# Patient Record
Sex: MALE | Race: BLACK OR AFRICAN AMERICAN | URBAN - METROPOLITAN AREA
[De-identification: names, ages, dates, MRNs, and addresses within clinical notes are randomized per-mention and may not be internally consistent; named-entity substitution may affect disease eponyms.]

---

## 2018-02-14 ENCOUNTER — HISTORICAL (OUTPATIENT)
Dept: ADMINISTRATIVE | Facility: HOSPITAL | Age: 23
End: 2018-02-14

## 2018-02-14 LAB
APTT PPP: 31.1 SECOND(S) (ref 23.3–37)
BUN SERPL-MCNC: 12 MG/DL (ref 7–18)
CALCIUM SERPL-MCNC: 9.2 MG/DL (ref 8.5–10.1)
CHLORIDE SERPL-SCNC: 105 MMOL/L (ref 98–107)
CO2 SERPL-SCNC: 29 MMOL/L (ref 21–32)
CREAT SERPL-MCNC: 0.9 MG/DL (ref 0.6–1.3)
ERYTHROCYTE [DISTWIDTH] IN BLOOD BY AUTOMATED COUNT: 12.4 % (ref 11.5–14.5)
GLUCOSE SERPL-MCNC: 93 MG/DL (ref 74–106)
HCT VFR BLD AUTO: 41.8 % (ref 40–51)
HGB BLD-MCNC: 14.1 GM/DL (ref 13.5–17.5)
INR PPP: 1.07 (ref 0.9–1.2)
MCH RBC QN AUTO: 27.8 PG (ref 26–34)
MCHC RBC AUTO-ENTMCNC: 33.7 GM/DL (ref 31–37)
MCV RBC AUTO: 82.4 FL (ref 80–100)
PLATELET # BLD AUTO: 379 X10(3)/MCL (ref 130–400)
PMV BLD AUTO: 9.7 FL (ref 7.4–10.4)
POTASSIUM SERPL-SCNC: 3.8 MMOL/L (ref 3.5–5.1)
PROTHROMBIN TIME: 13.2 SECOND(S) (ref 11.9–14.4)
RBC # BLD AUTO: 5.07 X10(6)/MCL (ref 4.5–5.9)
SODIUM SERPL-SCNC: 140 MMOL/L (ref 136–145)
WBC # SPEC AUTO: 6.4 X10(3)/MCL (ref 4.5–11)

## 2018-02-16 ENCOUNTER — HISTORICAL (OUTPATIENT)
Dept: SURGERY | Facility: HOSPITAL | Age: 23
End: 2018-02-16

## 2022-04-09 ENCOUNTER — HISTORICAL (OUTPATIENT)
Dept: ADMINISTRATIVE | Facility: HOSPITAL | Age: 27
End: 2022-04-09

## 2022-04-29 VITALS
DIASTOLIC BLOOD PRESSURE: 57 MMHG | BODY MASS INDEX: 30.72 KG/M2 | HEIGHT: 67 IN | WEIGHT: 195.75 LBS | SYSTOLIC BLOOD PRESSURE: 112 MMHG

## 2022-04-30 NOTE — OP NOTE
DATE OF SURGERY:    02/16/2018    SURGEON:  Yair Cooley MD    ATTENDING PHYSICIAN:  Yair Cooley MD    PREOPERATIVE DIAGNOSIS:  Chronic tonsillitis.    POSTOPERATIVE DIAGNOSIS:  Chronic tonsillitis.    OPERATIVE PROCEDURE:  Tonsillectomy.    ANESTHESIA:  General endotracheal.    ESTIMATED BLOOD LOSS:  10 cc.    COMPLICATIONS:  None.    SPECIMENS:  Tonsils.    FINDINGS:  2+ tonsils with evidence of chronic tonsillitis.    INDICATIONS FOR PROCEDURE:  The patient is a 22-year-old male with a history of chronic tonsillitis.  He has had multiple infections in the past with treatment with oral antibiotics.  It has gotten to the point now where he has had episodes so frequently where he is missing school and work.  He has previously been scheduled for surgery but missed for financial reasons but presents back today to discuss operative intervention.  We again reviewed all the risks, benefits, and alternatives to the procedure before proceeding, and he is willing to proceed today.    PROCEDURE IN DETAIL:  After appropriate witnessed informed consent was obtained, the patient was taken down to the operating room and laid in the supine position.  General endotracheal anesthesia was induced without complications, and the bed was turned 90 degrees.  Before proceeding any further, a full time-out was performed identifying the correct patient and the operative site.  We next placed the Jenny-Jonh mouth gag in the oral cavity and suspended the patient from the [place] stand.  Then palpating the soft palate, we did not find any evidence of cleft or submucous cleft palate.  A red rubber catheter was placed through the right nasal cavity and then used to retract the soft palate through the oral cavity.  We began with the right-hand side.  The tonsil was grasped using a curved Allis, and, pulling the tonsil medially, a mucosal incision was made with the Bovie electrocautery.  The mucosal incision extended slightly through the  anterior tonsillar pillar, and the capsule of the tonsil was identified.  The dissection was carried inferiorly until the tonsil was completely mobilized, and it was transected at its base.  Hemostasis was achieved with suction Bovie electrocautery.  We turned our attention to the left-hand side where the same procedure was performed.  The tonsil was grasped in the tonsillar fossa and rotated medially.  A mucosal incision was made with the Bovie electrocautery device, and dissection carried inferiorly where the tonsil was transected at its base.  These were sent off the field for routine histopathology.  Bovie electrocautery was used for hemostasis as well as Afrin-soaked tonsil balls.  The patient tolerated the procedure very well, and there were no complications.  A Valsalva maneuver was performed, and no further evidence of bleeding was identified.  The patient was then turned back to the anesthesia team in stable condition and brought to the recovery room without any complications.        ______________________________  Jayson Lorenz MD    ______________________________  MD GUERDA Solorzano/KERRY  DD:  02/16/2018  Time:  04:21PM  DT:  02/16/2018  Time:  04:40PM  Job #:  [id number]